# Patient Record
Sex: FEMALE | Race: OTHER | Employment: UNEMPLOYED | URBAN - METROPOLITAN AREA
[De-identification: names, ages, dates, MRNs, and addresses within clinical notes are randomized per-mention and may not be internally consistent; named-entity substitution may affect disease eponyms.]

---

## 2023-10-02 PROBLEM — Z34.90 PREGNANT: Status: ACTIVE | Noted: 2023-10-02

## 2023-10-11 ENCOUNTER — TELEPHONE (OUTPATIENT)
Age: 33
End: 2023-10-11

## 2023-10-11 NOTE — TELEPHONE ENCOUNTER
Sorry for the length. Arabella Persaud pt. Patient's friend called, name and  verified. Patient's friend called in trying to help the pt schedule an appointment due to her bleeding. She is currently 11 weeks pregnant. . There was an  on the line and upon asking more invasive questions, the friend finally revealed that the pt was not with her but at her own home. She was informed that I would need to speak directly to the pt and to have her call the office. She expressed understanding. I decided to reach out to the pt myself with an  on the line to get the full story. Pt has a subchorionic hemorrhage and has been bleeding for a few weeks now according to the office note. Since her appt with Dr. Arabella Persaud on 10/2, the bleeding had stopped. It returned last night and has been light and spotty, only when she wipes. It is bright red in color. Pt denies any cramping, or LOF. She denies any recent intercourse or straining during a bowel movement. She denies seeing any clots. She also denies any history of miscarriages. She has been given bleeding precautions and was told to immediatly go the ER if bleeding picks up due to the time of day (in regards to being seen here in the office). Pt repeated back instructions and expressed understanding.  Please let me know if you would advise of anything additional.    Session code: [id number]   ID: [id number]  Language: Bolivian

## 2023-10-12 ENCOUNTER — TELEPHONE (OUTPATIENT)
Age: 33
End: 2023-10-12

## 2023-10-12 NOTE — TELEPHONE ENCOUNTER
Attempted to reach the patient again to have her come into the office.  MARTINEZ.    ID: [id number]  Session code: [id number]  Language: Romanian

## 2023-10-12 NOTE — TELEPHONE ENCOUNTER
Call made to pt to have her come into the office today or tomorrow. LM for her to return my call.  used.  ID #: [id number]  Session code: [id number]

## 2023-10-13 ENCOUNTER — ROUTINE PRENATAL (OUTPATIENT)
Age: 33
End: 2023-10-13

## 2023-10-13 ENCOUNTER — TELEPHONE (OUTPATIENT)
Age: 33
End: 2023-10-13

## 2023-10-13 VITALS — WEIGHT: 160 LBS | SYSTOLIC BLOOD PRESSURE: 108 MMHG | DIASTOLIC BLOOD PRESSURE: 68 MMHG

## 2023-10-13 DIAGNOSIS — Z34.90 PREGNANCY, UNSPECIFIED GESTATIONAL AGE: ICD-10-CM

## 2023-10-13 NOTE — TELEPHONE ENCOUNTER
Call made to pt, finally able to reach her to get her on the schedule for an US and visit today per Dr. Carmencita Cabral. Pt will be here today @ 1:30.      used  Session code: [id number]  : [id number]

## 2023-10-13 NOTE — PROGRESS NOTES
TA ULTRASOUND PERFORMED 10/13/23  A SINGLE VIABLE 10W4D IUP IS SEEN WITH NORMAL CARDIAC RHYTHM. THERE APPEARS TO BE A HYPOECHOIC AREA ADJACENT TO THE GESTATIONAL SAC MEASURING 15 X 5 MM. GESTATIONAL AGE BASED ON PREVIOUS ULTRASOUND. A NORMAL YOLK SAC IS SEEN. RIGHT OVARY APPEARS WITHIN NORMAL LIMITS. LEFT OVARY APPEARS WITHIN NORMAL LIMITS. NO FREE FLUID IS SEEN IN THE CDS. Bleeding stopped after last visit. Then started again 3 days ago. Noticed blood mostly when urinating (in the toilet). Bleeding stopped yesterday; dark red spotting this morning. Reassurance of viability on today's ultrasound, still with evidence of small [facility]. Bleeding precautions reviewed. Advised avoidance of strenuous exercise and sex until bleeding resolves. Rh pos.     RTC for PRITESH visit 11/3 or sooner prn

## 2023-10-13 NOTE — TELEPHONE ENCOUNTER
Call made to pt, finally able to reach her to get her on the schedule for an US and visit today per Dr. Abiodun Ghosh. Pt will be here today @ 1:30.

## 2023-11-03 ENCOUNTER — ROUTINE PRENATAL (OUTPATIENT)
Age: 33
End: 2023-11-03

## 2023-11-03 VITALS — WEIGHT: 160 LBS | SYSTOLIC BLOOD PRESSURE: 106 MMHG | DIASTOLIC BLOOD PRESSURE: 72 MMHG

## 2023-11-03 DIAGNOSIS — Z34.82 ENCOUNTER FOR SUPERVISION OF OTHER NORMAL PREGNANCY, SECOND TRIMESTER: ICD-10-CM

## 2023-11-03 DIAGNOSIS — Z34.90 PREGNANCY, UNSPECIFIED GESTATIONAL AGE: Primary | ICD-10-CM

## 2023-11-03 LAB
ABO + RH BLD: NORMAL
BLOOD BANK CMNT PATIENT-IMP: NORMAL
BLOOD GROUP ANTIBODIES SERPL: NORMAL
ERYTHROCYTE [DISTWIDTH] IN BLOOD BY AUTOMATED COUNT: 12.7 % (ref 11.5–14.5)
HBV SURFACE AG SER QL: <0.1 INDEX
HBV SURFACE AG SER QL: NEGATIVE
HCT VFR BLD AUTO: 38.7 % (ref 35–47)
HCV AB SER IA-ACNC: 0.03 INDEX
HCV AB SERPL QL IA: NONREACTIVE
HGB BLD-MCNC: 13.3 G/DL (ref 11.5–16)
HIV 1+2 AB+HIV1 P24 AG SERPL QL IA: NONREACTIVE
HIV 1/2 RESULT COMMENT: NORMAL
MCH RBC QN AUTO: 30.6 PG (ref 26–34)
MCHC RBC AUTO-ENTMCNC: 34.4 G/DL (ref 30–36.5)
MCV RBC AUTO: 89.2 FL (ref 80–99)
NRBC # BLD: 0 K/UL (ref 0–0.01)
NRBC BLD-RTO: 0 PER 100 WBC
PLATELET # BLD AUTO: 261 K/UL (ref 150–400)
PMV BLD AUTO: 10.4 FL (ref 8.9–12.9)
RBC # BLD AUTO: 4.34 M/UL (ref 3.8–5.2)
RUBV IGG SERPL IA-ACNC: NORMAL IU/ML
SPECIMEN EXP DATE BLD: NORMAL
WBC # BLD AUTO: 9.6 K/UL (ref 3.6–11)

## 2023-11-03 NOTE — PROGRESS NOTES
After obtaining consent and per MD orders, flu vaccine given IM in left deltoid. No complaints noted. See immunization tab for additional details.   Dean Mcbride LPN  26/9/8331  53:50 AM

## 2023-11-03 NOTE — PROGRESS NOTES
services used today to conduct visit # [phone] labs and Pano/Horizon today  Feeling well, spotting has resolved  Flu shot today, covid booster recommended    RTC 4 wks

## 2023-11-04 LAB — VZV IGG SER IA-ACNC: 191 INDEX

## 2023-11-05 LAB — T PALLIDUM AB SER QL IA: NON REACTIVE

## 2023-11-07 LAB
HGB A MFR BLD: 97 % (ref 96.4–98.8)
HGB A2 MFR BLD COLUMN CHROM: 3 % (ref 1.8–3.2)
HGB F MFR BLD: 0 % (ref 0–2)
HGB FRACT BLD-IMP: NORMAL
HGB S MFR BLD: 0 %

## 2023-11-09 LAB
Lab: NEGATIVE
Lab: NORMAL
NTRA 22Q11.2 DELETION SYNDROME POPULATION-BASED RISK TEXT: NORMAL
NTRA 22Q11.2 DELETION SYNDROME RESULT TEXT: NORMAL
NTRA 22Q11.2 DELETION SYNDROME RISK SCORE TEXT: NORMAL
NTRA CYSTIC FIBROSIS: NEGATIVE
NTRA DUCHENNE/BECKER MUSCULAR DYSTROPHY: NEGATIVE
NTRA FETAL FRACTION: NORMAL
NTRA FRAGILE X SYNDROME: NEGATIVE
NTRA GENDER OF FETUS: NORMAL
NTRA MONOSOMY X AGE-BASED RISK TEXT: NORMAL
NTRA MONOSOMY X RESULT TEXT: NORMAL
NTRA MONOSOMY X RISK SCORE TEXT: NORMAL
NTRA SPINAL MUSCULAR ATROPHY: NEGATIVE
NTRA TRIPLOIDY RESULT TEXT: NORMAL
NTRA TRISOMY 13 AGE-BASED RISK TEXT: NORMAL
NTRA TRISOMY 13 RESULT TEXT: NORMAL
NTRA TRISOMY 13 RISK SCORE TEXT: NORMAL
NTRA TRISOMY 18 AGE-BASED RISK TEXT: NORMAL
NTRA TRISOMY 18 RESULT TEXT: NORMAL
NTRA TRISOMY 18 RISK SCORE TEXT: NORMAL
NTRA TRISOMY 21 AGE-BASED RISK TEXT: NORMAL
NTRA TRISOMY 21 RESULT TEXT: NORMAL
NTRA TRISOMY 21 RISK SCORE TEXT: NORMAL

## 2023-11-10 ENCOUNTER — TELEPHONE (OUTPATIENT)
Age: 33
End: 2023-11-10

## 2023-11-10 NOTE — TELEPHONE ENCOUNTER
Contacted patient using  services. Advised of negative/normal labs results and low risk genetic testing. Gender info put in envelope per patient request.  Will  at next visit.

## 2023-11-15 ENCOUNTER — TELEPHONE (OUTPATIENT)
Age: 33
End: 2023-11-15

## 2023-11-15 NOTE — TELEPHONE ENCOUNTER
Looked on [employer] site. Patient's Horizon missing information. Spoke with Rosey Jones from [facility]. Advised Sarah at the time Panorama/Horizon was drawn the patient reports she received a letter from [place] stating her insurance was active but did not have the card yet. The patient opted to have the labs drawn at that visit. Should be able to have Medicaid backdate the labs once receives card. Rosey Jones verbalized understanding. Patient will receive bill for services at this time as there is no insurance policy available to bill.

## 2023-12-01 ENCOUNTER — ROUTINE PRENATAL (OUTPATIENT)
Age: 33
End: 2023-12-01

## 2023-12-01 VITALS — WEIGHT: 163.8 LBS | SYSTOLIC BLOOD PRESSURE: 116 MMHG | DIASTOLIC BLOOD PRESSURE: 76 MMHG

## 2023-12-01 DIAGNOSIS — Z34.90 PREGNANCY, UNSPECIFIED GESTATIONAL AGE: Primary | ICD-10-CM

## 2023-12-01 PROCEDURE — 0502F SUBSEQUENT PRENATAL CARE: CPT | Performed by: OBSTETRICS & GYNECOLOGY

## 2023-12-01 NOTE — PROGRESS NOTES
#[id number] used to conduct today's visit  Doing well overall, increased fatigue  She is feeling fetal movement  Gender information given in envelope to patient today  FAS scheduled 12/15  Needs urine cx and gc,ch,tv  MSAFP today

## 2023-12-02 LAB
BACTERIA SPEC CULT: NORMAL
SERVICE CMNT-IMP: NORMAL

## 2023-12-04 LAB
AFP INTERP SERPL-IMP: NORMAL
AFP MOM SERPL: 1.12
AFP SERPL-MCNC: 46.6 NG/ML
AGE AT DELIVERY: 33.7 YR
AGE OF EGG DONOR: NORMAL
AGE OF EGG DONOR: NORMAL
COMMENT: NORMAL
DONOR EGG?: NO
DONOR EGG?: NORMAL
FAMILY HISTORY NTD: NORMAL
FHX NTD (Y OR N): NORMAL
GA METHOD: NORMAL
GA: 18 WEEKS
IDDM PATIENT QL: NO
INSULIN DEP. DIABETIC: NORMAL
Lab: 163
Lab: NORMAL
Lab: NORMAL
MAT SCN FOR FETAL ABNORMALITIES SERPL: NORMAL
MULTIPLE PREGNANCY: NORMAL
NEURAL TUBE DEFECT RISK FETUS: 8371
NUMBER OF FETUSES: NO
OTHER INDICATIONS: NO
OTHER INDICATIONS: NORMAL
PREVIOUSLY ELEVATED AFP (Y OR N): 18
PREVIOUSLY ELEVATED AFP (Y OR N): NO
PRIOR 1ST TRIM TESTING ?: NO
PRIOR 2ND TRIM TESTING ?: NO
PRIOR DS/NTD SCREEN CURRENT PREGNANCY?: NO
PRIOR DS/NTD SCREEN CURRENT PREGNANCY?: NORMAL
PRIOR FIRST TRIMESTER TESTING (Y OR N ): NORMAL
PRIOR PREGNANCY WITH DOWN SYNDROME (Y OR N): NO
PRIOR PREGNANCY WITH DOWN SYNDROME (Y OR N): NORMAL
TYPE OF EGG DONOR: NORMAL
TYPE OF EGG DONOR: NORMAL

## 2023-12-05 LAB
C TRACH RRNA SPEC QL NAA+PROBE: POSITIVE
N GONORRHOEA RRNA SPEC QL NAA+PROBE: NEGATIVE
SPECIMEN SOURCE: ABNORMAL
T VAGINALIS RRNA SPEC QL NAA+PROBE: NEGATIVE

## 2023-12-07 ENCOUNTER — TELEPHONE (OUTPATIENT)
Age: 33
End: 2023-12-07

## 2023-12-07 DIAGNOSIS — Z34.90 PREGNANCY, UNSPECIFIED GESTATIONAL AGE: Primary | ICD-10-CM

## 2023-12-07 RX ORDER — AZITHROMYCIN 500 MG/1
1000 TABLET, FILM COATED ORAL ONCE
Qty: 2 TABLET | Refills: 0 | Status: SHIPPED | OUTPATIENT
Start: 2023-12-07 | End: 2023-12-07

## 2023-12-07 NOTE — TELEPHONE ENCOUNTER
services used to contact patient via telephone.  # [id number]. Spoke with patient. Advised of results and recommendations. The patient verbalized understanding. Rx sent. PL updated.

## 2023-12-13 NOTE — PROGRESS NOTES
FETAL SURVEY A SINGLE VIABLE IUP AT 20W2D IS SEEN. FETAL CARDIAC MOTION OBSERVED. FETAL ANATOMY WAS WELL VISUALIZED AND APPEARS WNL. NO ABNORMALITIES WERE SEEN ON TODAYS EXAM. APPROPRIATE GROWTH MEASURED. SIZE = DATES. KRISTINA, PLACENTA AND CERVIX APPEAR WITHIN NORMAL LIMITS.  GENDER: WNL     # [id number] used to conduct today's visit  Tx for Chlamydia sent 12/7; plan REGULO for next visit  Overall feeling well!  +FM  Glucola next visit

## 2023-12-15 ENCOUNTER — ROUTINE PRENATAL (OUTPATIENT)
Age: 33
End: 2023-12-15

## 2023-12-15 VITALS — WEIGHT: 162.4 LBS | DIASTOLIC BLOOD PRESSURE: 60 MMHG | SYSTOLIC BLOOD PRESSURE: 114 MMHG

## 2023-12-15 DIAGNOSIS — Z34.90 PREGNANCY, UNSPECIFIED GESTATIONAL AGE: Primary | ICD-10-CM

## 2024-01-12 ENCOUNTER — ROUTINE PRENATAL (OUTPATIENT)
Age: 34
End: 2024-01-12

## 2024-01-12 VITALS — DIASTOLIC BLOOD PRESSURE: 68 MMHG | SYSTOLIC BLOOD PRESSURE: 116 MMHG | WEIGHT: 165 LBS

## 2024-01-12 DIAGNOSIS — O09.899 HISTORY OF MATERNAL CHLAMYDIA INFECTION, CURRENTLY PREGNANT: Primary | ICD-10-CM

## 2024-01-12 DIAGNOSIS — Z34.90 PREGNANCY, UNSPECIFIED GESTATIONAL AGE: ICD-10-CM

## 2024-01-12 DIAGNOSIS — Z34.82 PRENATAL CARE, SUBSEQUENT PREGNANCY IN SECOND TRIMESTER: ICD-10-CM

## 2024-01-12 LAB
BLOOD BANK CMNT PATIENT-IMP: NORMAL
BLOOD GROUP ANTIBODIES SERPL: NORMAL
ERYTHROCYTE [DISTWIDTH] IN BLOOD BY AUTOMATED COUNT: 13.2 % (ref 11.5–14.5)
GLUCOSE 1H P 100 G GLC PO SERPL-MCNC: 125 MG/DL (ref 65–140)
HCT VFR BLD AUTO: 40.3 % (ref 35–47)
HGB BLD-MCNC: 13.7 G/DL (ref 11.5–16)
HIV 1+2 AB+HIV1 P24 AG SERPL QL IA: NONREACTIVE
HIV 1/2 RESULT COMMENT: NORMAL
MCH RBC QN AUTO: 31.4 PG (ref 26–34)
MCHC RBC AUTO-ENTMCNC: 34 G/DL (ref 30–36.5)
MCV RBC AUTO: 92.2 FL (ref 80–99)
NRBC # BLD: 0 K/UL (ref 0–0.01)
NRBC BLD-RTO: 0 PER 100 WBC
PLATELET # BLD AUTO: 313 K/UL (ref 150–400)
PMV BLD AUTO: 9.9 FL (ref 8.9–12.9)
RBC # BLD AUTO: 4.37 M/UL (ref 3.8–5.2)
WBC # BLD AUTO: 10.6 K/UL (ref 3.6–11)

## 2024-01-12 PROCEDURE — 0502F SUBSEQUENT PRENATAL CARE: CPT | Performed by: OBSTETRICS & GYNECOLOGY

## 2024-01-12 NOTE — PROGRESS NOTES
GLUCOLA today  REGULO today --> suspect will still be positive, large amount of frothy discharge on exam, reports partner was never treated and they have still been sexually active  Good FM.   Denies nausea, but reports poor appetite, has only gained 5lbs. Discussed regular meals and snacks, and high protein/calorie rich foods. Reassurance that will likely begin to gain weight over the next 1-2 months.  C/O insomnia/sleeping trouble - afraid to take Unisom due to early prenatal bleeding, discussed possible benadryl prn, discussed avoidance of caffeine, good sleep hygiene, etc    RTC 4 wks    Vaishnavi Wiley MD  1/12/2024  11:56 AM

## 2024-01-15 LAB — T PALLIDUM AB SER QL IA: NON REACTIVE

## 2024-01-16 LAB
C TRACH RRNA SPEC QL NAA+PROBE: NEGATIVE
N GONORRHOEA RRNA SPEC QL NAA+PROBE: NEGATIVE
SPECIMEN SOURCE: NORMAL
T VAGINALIS RRNA SPEC QL NAA+PROBE: NEGATIVE